# Patient Record
Sex: FEMALE | Race: BLACK OR AFRICAN AMERICAN | NOT HISPANIC OR LATINO
[De-identification: names, ages, dates, MRNs, and addresses within clinical notes are randomized per-mention and may not be internally consistent; named-entity substitution may affect disease eponyms.]

---

## 2021-07-18 ENCOUNTER — TRANSCRIPTION ENCOUNTER (OUTPATIENT)
Age: 51
End: 2021-07-18

## 2022-06-28 ENCOUNTER — APPOINTMENT (OUTPATIENT)
Dept: ORTHOPEDIC SURGERY | Facility: CLINIC | Age: 52
End: 2022-06-28
Payer: COMMERCIAL

## 2022-06-28 VITALS — BODY MASS INDEX: 38.28 KG/M2 | HEIGHT: 60 IN | WEIGHT: 195 LBS

## 2022-06-28 DIAGNOSIS — M23.92 UNSPECIFIED INTERNAL DERANGEMENT OF LEFT KNEE: ICD-10-CM

## 2022-06-28 DIAGNOSIS — Z63.5 DISRUPTION OF FAMILY BY SEPARATION AND DIVORCE: ICD-10-CM

## 2022-06-28 DIAGNOSIS — Z78.9 OTHER SPECIFIED HEALTH STATUS: ICD-10-CM

## 2022-06-28 DIAGNOSIS — M17.12 UNILATERAL PRIMARY OSTEOARTHRITIS, LEFT KNEE: ICD-10-CM

## 2022-06-28 PROBLEM — Z00.00 ENCOUNTER FOR PREVENTIVE HEALTH EXAMINATION: Status: ACTIVE | Noted: 2022-06-28

## 2022-06-28 PROCEDURE — 99203 OFFICE O/P NEW LOW 30 MIN: CPT

## 2022-06-28 PROCEDURE — 73562 X-RAY EXAM OF KNEE 3: CPT | Mod: LT

## 2022-06-28 SDOH — SOCIAL STABILITY - SOCIAL INSECURITY: DISRUPTION OF FAMILY BY SEPARATION AND DIVORCE: Z63.5

## 2022-06-28 NOTE — PHYSICAL EXAM
[NL (0)] : extension 0 degrees [5___] : hamstring 5[unfilled]/5 [Positive] : positive Ignacia [] : patient ambulates without assistive device [Left] : left knee [Degenerative change] : Degenerative change [TWNoteComboBox7] : flexion 110 degrees

## 2022-06-28 NOTE — HISTORY OF PRESENT ILLNESS
[7] : 7 [Throbbing] : throbbing [Full time] : Work status: full time [de-identified] : 6/28/22:  recurrent left knee pain since january 2022 after knee gave out on her while walking down the steps and she fell.   she reports to a second incident like this april 2022.  pain and symptoms continue to persist.  worse with prolonged walking, stairs, bending, squatting.  reports to tightness.  occ clicking and cracking of the knee.  the knee is also giving out on her more frequently.   history of left knee approx 15 years  ago -  she was okay until recently.  rest, ice, nsaids prn with little relief.  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

## 2022-06-28 NOTE — DISCUSSION/SUMMARY
[de-identified] : Instructions:  Progress Note completed by Shobha Villa PA-C\par * Dr. Bryant -- The documentation recorded accurately reflects the decisions made by me during this visit.

## 2022-06-28 NOTE — ASSESSMENT
[FreeTextEntry1] : recurrent left knee pain since january 2022 after knee gave out on her while walking down the steps and she fell.   she reports to a second incident like this april 2022.  history of left knee approx 15 years  ago -  she was okay until recently.   lateral > medial knee pain.  buckling of knee as well.   mild to moderate pf oa.\par \par not getting better with rest, ice, alleve prn with little relief. \par \par post .

## 2022-08-09 ENCOUNTER — APPOINTMENT (OUTPATIENT)
Dept: ORTHOPEDIC SURGERY | Facility: CLINIC | Age: 52
End: 2022-08-09